# Patient Record
Sex: FEMALE | Race: WHITE
[De-identification: names, ages, dates, MRNs, and addresses within clinical notes are randomized per-mention and may not be internally consistent; named-entity substitution may affect disease eponyms.]

---

## 2017-02-28 NOTE — MR
EXAMINATION: MRI lumbar spine with and without contrast

 

HISTORY: Question Cauda equina

 

COMPARISON: None

 

TECHNIQUE: Multiplanar and multisequence images obtained through the lumbar spine before and followi
ng the administration of Gadavist.

 

FINDINGS: The lumbar spinal alignment is normal. The vertebral body heights and disc spaces appear w
ell-maintained. There is likely a small focus enhancement noted within the distal spinal cord poster
ior to T11, only partially imaged. The conus terminates at L1. The visualized retroperitoneal struct
ures appear normal. No abnormal bone marrow signal.

 

No significant disc bulge, spinal canal stenosis, neural foraminal stenosis.

 

IMPRESSION: 

1. Probable small focus of enhancement within the spinal cord posterior to T11. This is only include
d on the sagittal images. This could represent a demyelinating process. Follow-up with MRI images of
 the thoracic and cervical spine may be beneficial.

## 2017-02-28 NOTE — MR
EXAMINATION: MRI of the brain with and without contrast.

 

TECHNIQUE: Multiplanar and multisequence imaging of the brain without and following the administrati
on of 6 mL of Gadavist.

 

COMPARISON: 10/16/2014.

 

HISTORY: Abnormal findings..

 

FINDINGS:  

Cerebral hemispheres and the deep nuclei are without hemorrhage, mass, edema, enhancement or atrophy
.  There are several small subtle areas of FLAIR signal abnormality within the periventricular white
 matter. These are subtle however several are new since the prior MRI. No enhancement or abnormal di
ffusion restriction identified.

 

No extraaxial collections or hemorrhage. The ventricular system is of normal size and configuration 
without hydrocephalus. The brainstem and cerebellum are without hemorrhage, mass, edema, gliosis, en
hancement or atrophy.  

 

Carotid basilar artery flow voids are intact.  The venous sinuses are patent.

The otomastoid airspaces are clear.  No internal auditory canal or cerebellopontine angle masses or 
enhancement.  The paranasal sinuses are clear.  

 

Globes, optic nerves, orbital apices, optic chiasm, optic tracts,  and visual cortices are unremarka
ble.  The pituitary and sella turcica are unremarkable.  

No meningeal enhancement.  The craniocervical junction is unremarkable.

 

No siderosis or evidence of vascular malformation.  The calvarium is intact.

 

IMPRESSION:

1. There are a few subtle periventricular white matter hypodensities, these have slightly increased 
in comparison to the previous MRI from 2004. A demyelinating process, such as MS, is a consideration
. No abnormal enhancement to suggest active disease.

## 2017-06-07 NOTE — EDM.PDOC
ED HPI GENERAL MEDICAL PROBLEM





- General


Chief Complaint: Headache


Stated Complaint: PT HAS MIGRAINE


Time Seen by Provider: 06/07/17 21:13


Source of Information: Reports: Patient


History Limitations: Reports: No Limitations





- History of Present Illness


INITIAL COMMENTS - FREE TEXT/NARRATIVE: 





HISTORY AND PHYSICAL:


[27-year-old female presenting with a headache that is worse when standing up 

than laying down]





History of Present Illness:


[Patient had a spinal tap yesterday, headaches began about 2 hours after the 

spinal tap.


Patient called her neurologist today and was told to take some ibuprofen now 

for about an hour]





Review of Systems:


As per history of present illness and below otherwise all 


systems reviewed and negative.  





Past medical history:


As per history of present illness and as reviewed below


otherwise noncontributory.





Surgical history:


As per history of present illness and as reviewed below


otherwise noncontributory.





Social history:


No reported history of drug or alcohol abuse.





Family history:


As per history of present illness and as reviewed below


otherwise noncontributory.





Physical exam: Alert and oriented female.  pain 8-9/10.


HEENT: Atraumatic, normocehpalic, pupils reactive, negative for conjunctival 

pallor or scleral icterus, mucous membranes moist, throat clear, neck supple, 

nontender, trachea midline.  


Lungs: Clear to auscultation, breath sounds equal bilaterally, chest non 

tender.  


Heart: S1S2, regular, negative for clicks, rubs, or JVD.


Abdomen: Soft, nondistended, nontender.  Negative for masses or 

hepatossplenmegaly. Negative for costovertebral tenderness.


Pelvis: Stable nontender.


Genitourinary: Deferred.


Rectal: Deferred


Extremities: Atraumatic, negative for cords or calf pain.  


Neurovascular unremarkable.


Neuro:  Awake, alert, oriented.  Cranial nerves II through XII


unremarkable.  Cerebellum unremarkable.  Motor and sensory unremarkable 

throughout.  Exam nonfocal.





Discussed case with Dr. Lopez who is in agreement and anesthesia was notified. 

Juan Robles CRNA was notified and he will come in and see this patient  





Diagnostics:


[]





Therapeutics:


[IV fluid]





Impression:


[Headache post spinal tap]





Plan:


[Anesthesiology here for treatment of this headache]





Definitive disposition and diagnosis as appropriate pending


reevaluation and review of above.  


Onset: Today


  ** head


Pain Score (Numeric/FACES): 8





- Related Data


 Allergies











Allergy/AdvReac Type Severity Reaction Status Date / Time


 


amoxicillin [Amoxicillin] Allergy  Rash Verified 06/07/17 21:04


 


Penicillins Allergy  Rash Verified 06/07/17 21:04











Home Meds: 


 Home Meds





ClonazePAM [KlonoPIN] 1 mg PO BEDTIME 10/06/14 [History]


Norethindrone-E.estradiol-Iron [Lo Loestrin Fe 1-10] 1 each PO DAILY 10/06/14 [

History]











Past Medical History


HEENT History: Reports: None


Cardiovascular History: Reports: None


Respiratory History: Reports: None


Gastrointestinal History: Reports: None


Genitourinary History: Reports: None


OB/GYN History: Reports: Pregnancy


Musculoskeletal History: Reports: None


Neurological History: Reports: None


Psychiatric History: Reports: None


Endocrine/Metabolic History: Reports: None


Hematologic History: Reports: None


Dermatologic History: Reports: None





- Infectious Disease History


Infectious Disease History: Reports: Chicken Pox





- Past Surgical History


HEENT Surgical History: Reports: None





Social & Family History





- Family History


Family Medical History: Noncontributory





- Tobacco Use


Smoking Status *Q: Current Every Day Smoker


Years of Tobacco use: 12


Packs/Tins Daily: 1


Used Tobacco, but Quit: No


Month Tobacco Last Used: September





- Alcohol Use


Days Per Week of Alcohol Use: 2


Number of Drinks Per Day: 1


Total Drinks Per Week: 2





- Recreational Drug Use


Recreational Drug Use: No





ED ROS GENERAL





- Review of Systems


Review Of Systems: ROS reveals no pertinent complaints other than HPI.





- Physical Exam


Exam: See Below





Course





- Vital Signs


Last Recorded V/S: 


 Last Vital Signs











Temp  36.3 C   06/07/17 21:05


 


Pulse  105 H  06/07/17 21:05


 


Resp  18   06/07/17 21:05


 


BP  143/96 H  06/07/17 21:05


 


Pulse Ox  98   06/07/17 21:05














- Orders/Labs/Meds


Orders: 


 Active Orders 24 hr











 Category Date Time Status


 


 Verify Patient Consent Obtain [RC] ASDIRECTED Care  06/07/17 21:38 Active


 


 Sodium Chloride 0.9% [Normal Saline] 1,000 ml Med  06/07/17 21:15 Active





 IV STAT   


 


 Sodium Chloride 0.9% [Saline Flush] Med  06/07/17 21:15 Active





 10 ml FLUSH ASDIRECTED PRN   


 


 Sodium Chloride 0.9% [Saline Flush] Med  06/07/17 21:38 Active





 10 ml FLUSH ASDIRECTED PRN   


 


 Sodium Chloride 0.9% [Saline Flush] Med  06/07/17 21:15 Active





 2.5 ml FLUSH ASDIRECTED PRN   


 


 Sodium Chloride 0.9% [Saline Flush] Med  06/07/17 21:38 Active





 2.5 ml FLUSH ASDIRECTED PRN   


 


 Medication Administration Instruction [OM.PC] Routine Oth  06/07/17 21:38 

Ordered


 


 Peripheral IV Insertion Adult [OM.PC] Routine Oth  06/07/17 21:38 Ordered


 


 Saline Lock Insert [OM.PC] Stat Oth  06/07/17 21:14 Ordered








 Medication Orders





Sodium Chloride (Normal Saline)  1,000 mls @ 999 mls/hr IV STAT ONE


   Stop: 06/07/17 22:15


   Last Admin: 06/07/17 21:25  Dose: 999 mls/hr


Sodium Chloride (Saline Flush)  10 ml FLUSH ASDIRECTED PRN


   PRN Reason: Keep Vein Open


Sodium Chloride (Saline Flush)  2.5 ml FLUSH ASDIRECTED PRN


   PRN Reason: Keep Vein Open


Sodium Chloride (Saline Flush)  10 ml FLUSH ASDIRECTED PRN


   PRN Reason: Keep Vein Open


Sodium Chloride (Saline Flush)  2.5 ml FLUSH ASDIRECTED PRN


   PRN Reason: Keep Vein Open








Labs: 


 Laboratory Tests











  06/07/17 Range/Units





  21:23 


 


Plt Count  255  (150-400)  K/uL











Meds: 


Medications











Generic Name Dose Route Start Last Admin





  Trade Name Freq  PRN Reason Stop Dose Admin


 


Sodium Chloride  1,000 mls @ 999 mls/hr  06/07/17 21:15  06/07/17 21:25





  Normal Saline  IV  06/07/17 22:15  999 mls/hr





  STAT ONE   Administration


 


Sodium Chloride  10 ml  06/07/17 21:15  





  Saline Flush  FLUSH   





  ASDIRECTED PRN   





  Keep Vein Open   


 


Sodium Chloride  2.5 ml  06/07/17 21:15  





  Saline Flush  FLUSH   





  ASDIRECTED PRN   





  Keep Vein Open   


 


Sodium Chloride  10 ml  06/07/17 21:38  





  Saline Flush  FLUSH   





  ASDIRECTED PRN   





  Keep Vein Open   


 


Sodium Chloride  2.5 ml  06/07/17 21:38  





  Saline Flush  FLUSH   





  ASDIRECTED PRN   





  Keep Vein Open   














Departure





- Departure


Time of Disposition: 22:06


Disposition: Home, Self-Care 01


Condition: good


Clinical Impression: 


Post-procedural headache


Qualifiers:


 Encounter type: initial encounter Qualified Code(s): T81.89XA - Other 

complications of procedures, not elsewhere classified, initial encounter; R51 - 

Headache








- Discharge Information


Forms:  ED Department Discharge


Additional Instructions: 


The following information is given to patients seen in the emergency department 

who are being discharged to home. This information is to outline your options 

for follow-up care. We provide all patients seen in our emergency department 

with a follow-up referral.





The need for follow-up, as well as the timing and circumstances, are variable 

depending upon the specifics of your emergency department visit.





If you don't have a primary care physician on staff, we will provide you with a 

referral. We always advise you to contact your personal physician following an 

emergency department visit to inform them of the circumstance of the visit and 

for follow-up with them and/or the need for any referrals to a consulting 

specialist.





The emergency department will also refer you to a specialist when appropriate. 

This referral assures that you have the opportunity for followup care with a 

specialist. All of these measure are taken in an effort to provide you with 

optimal care, which includes your followup.





Under all circumstances we always encourage you to contact your private 

physician who remains a resource for coordinating  your care. When calling for 

followup care, please make the office aware that this follow-up is from your 

recent emergency room visit. If for any reason you are refused follow-up, 

please contact the [facility] emergency department at [phone] 

and asked to speak to the emergency department charge nurse.





Instructions as per anesthesia





- My Orders


Last 24 Hours: 


My Active Orders





06/07/17 21:14


Saline Lock Insert [OM.PC] Stat 





06/07/17 21:15


Sodium Chloride 0.9% [Normal Saline] 1,000 ml IV STAT 


Sodium Chloride 0.9% [Saline Flush]   10 ml FLUSH ASDIRECTED PRN 


Sodium Chloride 0.9% [Saline Flush]   2.5 ml FLUSH ASDIRECTED PRN 














- Assessment/Plan


Last 24 Hours: 


My Active Orders





06/07/17 21:14


Saline Lock Insert [OM.PC] Stat 





06/07/17 21:15


Sodium Chloride 0.9% [Normal Saline] 1,000 ml IV STAT 


Sodium Chloride 0.9% [Saline Flush]   10 ml FLUSH ASDIRECTED PRN 


Sodium Chloride 0.9% [Saline Flush]   2.5 ml FLUSH ASDIRECTED PRN

## 2017-06-07 NOTE — PCM.SN
- Free Text/Narrative


Note: 


Called by ER, pt arrived with complaints of HA.  Pt reports that she had a 

spinal tap performed yesterday and developed a headache about two hours after 

the procedure.  Pt reports her headache gets significantly worse when standing 

and somewhat better when laying down.  She also reports some mild photophobia 

at this time.  





Allergies:  PCN, amoxicillin


Home RX:  Klonopin at [facility]


VS - reviewed


PMH:  Currently being worked up for MS.  Negative for CV, Pulm, GI, , Liver, 

Pancreas, Thyroid disease.





Explained to pt that this is likely a spinal headache.  Explained blood patch 

procedure including risks and benefits.  Pt wishes to proceed at this time.

## 2017-06-07 NOTE — PCM.SN
- Free Text/Narrative


Note: 


Procedure Note





Epidural Blood Patch





After informed consent was obtained.  Pt was placed Right lateral position.  

Using sterile technique, pt was prepped using betadine, sterile drape was 

placed.  Spine was palpated and L3-4 was identified.  1% Lidocaine was 

infiltrated.  17g Touhy needle was introduced using loss of resistance 

technique.  At 6 cm of depth loss of resistance was obtained.  Then 10mL of 

venous blood was obtained sterilely by Dr. Lentz and 6mL of arterial blood 

was obtained sterilely by JOAN Robles CRNA that was injected through the epidural 

needle.  All aspects of the procedure were completed while maintaining strict 

sterile technique.  At the conclusion of the procedure that pt notes that in 

the supine position she no longer has a headache and her photophobia has 

resolved.  Pt tolerated all aspects of the procedure well.  VSS throughout as 

well.

## 2019-09-11 ENCOUNTER — HOSPITAL ENCOUNTER (EMERGENCY)
Dept: HOSPITAL 56 - MW.ED | Age: 29
Discharge: HOME | End: 2019-09-11
Payer: OTHER GOVERNMENT

## 2019-09-11 VITALS — DIASTOLIC BLOOD PRESSURE: 80 MMHG | HEART RATE: 99 BPM | SYSTOLIC BLOOD PRESSURE: 124 MMHG

## 2019-09-11 DIAGNOSIS — M54.6: ICD-10-CM

## 2019-09-11 DIAGNOSIS — M25.512: ICD-10-CM

## 2019-09-11 DIAGNOSIS — Z88.0: ICD-10-CM

## 2019-09-11 DIAGNOSIS — R07.9: Primary | ICD-10-CM

## 2019-09-11 DIAGNOSIS — Z88.1: ICD-10-CM

## 2019-09-11 LAB
BUN SERPL-MCNC: 12 MG/DL (ref 7–18)
CHLORIDE SERPL-SCNC: 102 MMOL/L (ref 98–107)
CO2 SERPL-SCNC: 27.6 MMOL/L (ref 21–32)
GLUCOSE SERPL-MCNC: 86 MG/DL (ref 74–106)
POTASSIUM SERPL-SCNC: 3.2 MMOL/L (ref 3.5–5.1)
SODIUM SERPL-SCNC: 140 MMOL/L (ref 136–145)

## 2019-09-11 PROCEDURE — 96360 HYDRATION IV INFUSION INIT: CPT

## 2019-09-11 PROCEDURE — 99285 EMERGENCY DEPT VISIT HI MDM: CPT

## 2019-09-11 PROCEDURE — 81025 URINE PREGNANCY TEST: CPT

## 2019-09-11 PROCEDURE — 80053 COMPREHEN METABOLIC PANEL: CPT

## 2019-09-11 PROCEDURE — 84443 ASSAY THYROID STIM HORMONE: CPT

## 2019-09-11 PROCEDURE — 93005 ELECTROCARDIOGRAM TRACING: CPT

## 2019-09-11 PROCEDURE — 85025 COMPLETE CBC W/AUTO DIFF WBC: CPT

## 2019-09-11 PROCEDURE — 71046 X-RAY EXAM CHEST 2 VIEWS: CPT

## 2019-09-11 NOTE — EDM.PDOC
ED HPI GENERAL MEDICAL PROBLEM





- General


Chief Complaint: Cardiovascular Problem


Stated Complaint: EKG WAS WEIRD FROM THE DOCTOR'S OFFICE


Time Seen by Provider: 09/11/19 16:05


Source of Information: Reports: Patient


History Limitations: Reports: No Limitations





- History of Present Illness


INITIAL COMMENTS - FREE TEXT/NARRATIVE: 


HISTORY AND PHYSICAL:





History of present illness:


Patient is a 29-year-old female presenting to the emergency room with 

complaints of chest pain, left shoulder pain, and upper left back pain. She 

states that she was informed to present to the emergency room at her 

appointment with Dr. Armstrong at [facility] this afternoon due to an abnormal 

EKG showing sinus tachycardia. She states that she is not currently having 

chest pain, however after using a wheelbarrow that tipped over 3 weeks ago, one 

week later she started having chest pain that goes into her shoulder, down her 

arm and hands, into her left ribs, and up into her back. She denies numbness, 

tingling, nausea, vomiting, dizziness, abdominal pain, or shortness of breath. 

She states that the pain is increased when she is active, but resting 

alleviates the pain. 





Review of systems: 


As per history of present illness and below otherwise all systems reviewed and 

negative.





Past medical history: 


As per history of present illness and as reviewed below otherwise 

noncontributory.





Surgical history: 


As per history of present illness and as reviewed below otherwise 

noncontributory.





Social history: 


See social history for further information





Family history: 


As per history of present illness and as reviewed below otherwise 

noncontributory.





Physical exam:


General: Patient is a well-nourished and well-developed 29-year-old female. She 

is alert and orientated. Nontoxic in appearance. 


HEENT: Atraumatic, normocephalic, pupils equal and reactive bilaterally, 

negative for conjunctival pallor or scleral icterus, mucous membranes moist, 

TMs normal bilaterally, throat clear, neck supple, nontender, trachea midline. 

No drooling or trismus noted. No meningeal signs. No hot potato voice noted. 


Lungs: Clear to auscultation, breath sounds equal bilaterally, chest nontender.


Heart: S1S2, regular rate and rhythm without overt murmur


Abdomen: Soft, nondistended, nontender. Negative for masses or 

hepatosplenomegaly. Negative for costovertebral tenderness.


Skin: Intact, warm, dry. No lesions or rashes noted.


Back: Tenderness on the posterior, left side of the back, inferior to the 

scapula.


Extremities: Atraumatic, moves all extremities per self without difficulty or 

deficits, negative for cords or calf pain. Neurovascular unremarkable.


Neuro: Awake, alert, oriented. Cranial nerves II through XII unremarkable. 

Cerebellum unremarkable. Motor and sensory unremarkable throughout. Exam 

nonfocal.





Notes:


Diagnostics were unremarkable. All findings were shared with the patient. Did 

offer her Flexeril and diclofenac as this does sound muscular in nature. She 

declines. Supportive care measures were reviewed and discussed. Voices 

understanding and is agreeable to plan of care. Denies any further questions or 

concerns at this time.





Diagnostics:


CBC, CMP, TSH, EKG, chest x-ray, urine pregnancy test





Therapeutics:


IV Fluid





Prescription:


Declines





Impression: 


Chest pain, nonspecific





Plan:


1. You may alternate ice and/or heat as needed for discomfort. Gentle 

stretching. Limit your vigorous activity to allow her body to rest/recover.


2. Tylenol and/or ibuprofen as needed for pain management.


3. Follow-up with your primary care provider as we discussed. Return to the ED 

as needed and as discussed.





Definitive disposition and diagnosis as appropriate pending reevaluation and 

review of above.





  ** L chest arm and back


Pain Score (Numeric/FACES): 1





- Related Data


 Allergies











Allergy/AdvReac Type Severity Reaction Status Date / Time


 


amoxicillin [Amoxicillin] Allergy  Rash Verified 09/11/19 16:28


 


Penicillins Allergy  Rash Verified 09/11/19 16:28











Home Meds: 


 Home Meds





. [No Known Home Meds]  09/11/19 [History]











Past Medical History


HEENT History: Reports: None


Cardiovascular History: Reports: None


Respiratory History: Reports: None


Gastrointestinal History: Reports: None


Genitourinary History: Reports: None


OB/GYN History: Reports: Pregnancy


Musculoskeletal History: Reports: None


Neurological History: Reports: None


Psychiatric History: Reports: None


Endocrine/Metabolic History: Reports: None


Hematologic History: Reports: None


Dermatologic History: Reports: None





- Infectious Disease History


Infectious Disease History: Reports: Chicken Pox





- Past Surgical History


HEENT Surgical History: Reports: None





Social & Family History





- Family History


Family Medical History: Noncontributory





ED ROS GENERAL





- Review of Systems


Review Of Systems: ROS reveals no pertinent complaints other than HPI.





ED EXAM, GENERAL





- Physical Exam


Exam: See Below (See dictation)





Course





- Vital Signs


Last Recorded V/S: 


 Last Vital Signs











Temp  97.8 F   09/11/19 16:24


 


Pulse  99   09/11/19 16:24


 


Resp  18   09/11/19 16:24


 


BP  138/97 H  09/11/19 16:24


 


Pulse Ox  98   09/11/19 16:24














- Orders/Labs/Meds


Orders: 


 Active Orders 24 hr











 Category Date Time Status


 


 EKG Documentation Completion [RC] STAT Care  09/11/19 16:20 Active











Labs: 


 Laboratory Tests











  09/11/19 09/11/19 09/11/19 Range/Units





  16:15 16:15 16:30 


 


WBC  10.62    (4.0-11.0)  K/uL


 


RBC  4.52    (4.30-5.90)  M/uL


 


Hgb  13.7    (12.0-16.0)  g/dL


 


Hct  40.2    (36.0-46.0)  %


 


MCV  88.9    (80.0-98.0)  fL


 


MCH  30.3    (27.0-32.0)  pg


 


MCHC  34.1    (31.0-37.0)  g/dL


 


RDW Std Deviation  39.9    (28.0-62.0)  fl


 


RDW Coeff of Bhanu  13    (11.0-15.0)  %


 


Plt Count  211    (150-400)  K/uL


 


MPV  9.90    (7.40-12.00)  fL


 


Neut % (Auto)  74.4    (48.0-80.0)  %


 


Lymph % (Auto)  14.5 L    (16.0-40.0)  %


 


Mono % (Auto)  9.3    (0.0-15.0)  %


 


Eos % (Auto)  1.5    (0.0-7.0)  %


 


Baso % (Auto)  0.3    (0.0-1.5)  %


 


Neut # (Auto)  7.9 H    (1.4-5.7)  K/uL


 


Lymph # (Auto)  1.5    (0.6-2.4)  K/uL


 


Mono # (Auto)  1.0 H    (0.0-0.8)  K/uL


 


Eos # (Auto)  0.2    (0.0-0.7)  K/uL


 


Baso # (Auto)  0.0    (0.0-0.1)  K/uL


 


Nucleated RBC %  0.0    /100WBC


 


Nucleated RBCs #  0    K/uL


 


Sodium   140   (136-145)  mmol/L


 


Potassium   3.2 L   (3.5-5.1)  mmol/L


 


Chloride   102   ()  mmol/L


 


Carbon Dioxide   27.6   (21.0-32.0)  mmol/L


 


BUN   12   (7.0-18.0)  mg/dL


 


Creatinine   0.9   (0.6-1.0)  mg/dL


 


Est Cr Clr Drug Dosing   80.36   mL/min


 


Estimated GFR (MDRD)   > 60.0   ml/min


 


Glucose   86   ()  mg/dL


 


Calcium   8.8   (8.5-10.1)  mg/dL


 


Total Bilirubin   0.4   (0.2-1.0)  mg/dL


 


AST   16   (15-37)  IU/L


 


ALT   20   (14-63)  IU/L


 


Alkaline Phosphatase   66   ()  U/L


 


Total Protein   7.7   (6.4-8.2)  g/dL


 


Albumin   4.3   (3.4-5.0)  g/dL


 


Globulin   3.4   (2.6-4.0)  g/dL


 


Albumin/Globulin Ratio   1.3   (0.9-1.6)  


 


TSH 3rd Generation   1.36   (0.36-3.74)  uIU/mL


 


Urine HCG, Qual    NEGATIVE  (NEGATIVE)  











Meds: 


Medications














Discontinued Medications














Generic Name Dose Route Start Last Admin





  Trade Name Freq  PRN Reason Stop Dose Admin


 


Sodium Chloride  1,000 mls @ 999 mls/hr  09/11/19 16:29  09/11/19 16:45





  Normal Saline  IV  09/11/19 17:29  999 mls/hr





  STAT ONE   Administration





     





     





     





     














Departure





- Departure


Time of Disposition: 18:24


Disposition: Home, Self-Care 01


Clinical Impression: 


 Nonspecific chest pain





Instructions:  Nonspecific Chest Pain, Easy-to-Read


Referrals: 


Rocio Armstrong MD [Primary Care Provider] - 


Forms:  ED Department Discharge


Additional Instructions: 


The following information is given to patients seen in the emergency department 

who are being discharged to home. This information is to outline your options 

for follow-up care. We provide all patients seen in our emergency department 

with a follow-up referral.





The need for follow-up, as well as the timing and circumstances, are variable 

depending upon the specifics of your emergency department visit.





If you don't have a primary care physician on staff, we will provide you with a 

referral. We always advise you to contact your personal physician following an 

emergency department visit to inform them of the circumstance of the visit and 

for follow-up with them and/or the need for any referrals to a consulting 

specialist.





The emergency department will also refer you to a specialist when appropriate. 

This referral assures that you have the opportunity for follow-up care with a 

specialist. All of these measure are taken in an effort to provide you with 

optimal care, which includes your follow-up.





Under all circumstances we always encourage you to contact your private 

physician who remains a resource for coordinating your care. When calling for 

follow-up care, please make the office aware that this follow-up is from your 

recent emergency room visit. If for any reason you are refused follow-up, 

please contact the [facility] Emergency 

Department at [phone] and asked to speak to the emergency department 

charge nurse.





[facility]


Primary Care


[address] [place], [place] [id number]


Phone: [phone]


Fax: [phone]





[facility]


[address] [place] [address]


Phone: [phone]


Fax: [phone]





1. You may alternate ice and/or heat as needed for discomfort. Gentle 

stretching. Limit your vigorous activity to allow her body to rest/recover.


2. Tylenol and/or ibuprofen as needed for pain management.


3. Follow-up with your primary care provider as we discussed. Return to the ED 

as needed and as discussed.








- My Orders


Last 24 Hours: 


My Active Orders





09/11/19 16:20


EKG Documentation Completion [RC] STAT 














- Assessment/Plan


Last 24 Hours: 


My Active Orders





09/11/19 16:20


EKG Documentation Completion [RC] STAT

## 2021-01-19 NOTE — CR
INDICATION: chest pain



COMPARISON:



None.



FINDINGS:



PA and lateral views of the chest demonstrate adequate inflation of the 

lungs. No focal airspace consolidation, pneumothorax or effusion. 

Cardiomediastinal silhouette is within normal limits. There are no acute 

osseous findings.



IMPRESSION:



No acute cardiopulmonary findings.



Dictated by Hermilo Malone MD @ 9/11/2019 6:06:31 PM



Dictated by: Hermilo Malone MD @ 09/11/2019 18:06:36



(Electronically Signed) Wound Care Instructions:   Every other day or daily if drainage is coming through cover dressings - remove the old bandage(s).  Wash with a mild soap & water or Vashe solution(blue bottle).  Pat dry. Apply Iodoflex to wound bed.  Mix barrier cream(orange cap) with Antifungal powder and apply to dano wound.  Cover with dry dressing of choice.       Always monitor for signs of infection or deterioration in the wound and surrounding skin.  These symptoms can include fever, chills, sweats.  Increased redness, drainage, pain or swelling around the wound can also, but not always, indicate infection.    If you are experiencing any of these, please seek medical attention.  You may call the clinic during regular business hours. If it is after hours or on the weekend you can visit an urgent care facility or emergency department.    If you have any questions or concerns between now and your appointment please call the clinic.    [facility]  Wound Clinic - [address], Sports Medicine Entrance  [address]  [facility], [place] [address]  Telephone [phone]  Fax: [phone]